# Patient Record
Sex: FEMALE | Race: WHITE
[De-identification: names, ages, dates, MRNs, and addresses within clinical notes are randomized per-mention and may not be internally consistent; named-entity substitution may affect disease eponyms.]

---

## 2021-07-08 ENCOUNTER — HOSPITAL ENCOUNTER (EMERGENCY)
Dept: HOSPITAL 11 - JP.ED | Age: 76
Discharge: HOME | End: 2021-07-08
Payer: MEDICARE

## 2021-07-08 DIAGNOSIS — L03.115: Primary | ICD-10-CM

## 2021-07-08 DIAGNOSIS — Z88.2: ICD-10-CM

## 2021-07-08 NOTE — EDM.PDOC
ED HPI GENERAL MEDICAL PROBLEM





- General


Chief Complaint: Skin Complaint


Stated Complaint: RIGHT LEG


Time Seen by Provider: 07/08/21 03:27


Source of Information: Reports: Patient


History Limitations: Reports: No Limitations





- History of Present Illness


INITIAL COMMENTS - FREE TEXT/NARRATIVE: 


Kenna is a 76-year-old female presenting to the ED for evaluation of possible 

cellulitis involving her anterior right lower leg.  She started noticing 

increased redness, heat, and tenderness yesterday.  She has several bug bites on

the leg which are excoriated in one area which appears ecchymotic and has a 

small bulla adjacent to it.  Again the area is hot to the touch, red, swollen, 

and very tender.  It does appear to be tracking down the leg.  It is not in the 

region of any significant vasculature.





  ** denies pain


Pain Score (Numeric/FACES): 0





- Related Data


                                    Allergies











Allergy/AdvReac Type Severity Reaction Status Date / Time


 


Sulfa (Sulfonamide Allergy  Abdominal Verified 07/08/21 03:24





Antibiotics)   Cramps  














ED ROS GENERAL





- Review of Systems


Review Of Systems: See Below


Musculoskeletal: Reports: No Symptoms


Skin: Reports: Erythema, Wound (Redness and swelling with excoriation in the 

same region.)


Neurological: Reports: No Symptoms





ED EXAM, SKIN/RASH


Exam: See Below


Exam Limited By: No Limitations


General Appearance: Alert, No Apparent Distress


Extremities: Increased Warmth


Skin: Erythema, Excoriations, Increased Warmth


Location, Skin: Lower Extremity, Right


Associated features: Warmth, Tenderness, Swelling, Induration


Lymphatic: No Adenopathy





Course





- Vital Signs


Last Recorded V/S: 





                                Last Vital Signs











Temp  36.2 C   07/08/21 03:22


 


Pulse  98   07/08/21 03:22


 


Resp  18   07/08/21 03:22


 


BP  171/78 H  07/08/21 03:22


 


Pulse Ox  96   07/08/21 03:22














- Re-Assessments/Exams


Free Text/Narrative Re-Assessment/Exam: 





07/08/21 03:31 this appears to be a developing cellulitis involving the right 

lower extremity anteriorly.  We will start the patient on cephalexin 500 mg 3 

times daily for 5 days.  This has been sent out to the [facility].  

Patient should follow-up with her primary care provider if not improving over 

the course of the next 3 to 5 days.  She may return to the ED if they are not 

available.








Departure





- Departure


Time of Disposition: 03:32


Disposition: Home, Self-Care 01


Clinical Impression: 


 Cellulitis of right lower leg








- Discharge Information


Instructions:  Cellulitis, Adult


Referrals: 


PCP,None [Primary Care Provider] - 


Care Plan Goals: 


It appears that you are developing a cellulitis of the right lower leg.  We are 

going to start you on an antibiotic called cephalexin 500 mg that you are going 

to take 3 times a day for the next 5 days.  I fully anticipate that this should 

clear up the infection.  Please try to avoid scratching the wound as it will 

seed other bacteria in the site and could prolong the infection.  Follow-up with

your primary care provider if not improving over the course of the next 4 to 5 

days.  If they are not available you may return to the ED for reevaluation.





Sepsis Event Note (ED)





- Evaluation


Sepsis Screening Result: No Definite Risk





- Focused Exam


Vital Signs: 





                                   Vital Signs











  Temp Pulse Resp BP Pulse Ox


 


 07/08/21 03:22  36.2 C  98  18  171/78 H  96














- Problem List & Annotations


(1) Cellulitis of right lower leg


SNOMED Code(s): 956157247


   Code(s): L03.115 - CELLULITIS OF RIGHT LOWER LIMB   Status: Acute   Priority:

Low   Current Visit: Yes   





- Problem List Review


Problem List Initiated/Reviewed/Updated: Yes